# Patient Record
(demographics unavailable — no encounter records)

---

## 2024-11-15 NOTE — PHYSICAL EXAM
[de-identified] : Constitutional:  80 year old male, alert and oriented, cooperative, in no acute distress.  HEENT  NC/AT.  Appearance: symmetric  Neck/Back Straight without deformity or instability.  Good ROM.  Chest/Respiratory  Respiratory effort: no intercostal retractions or use of accessory muscles. Nonlabored Breathing  Mental Status:  Judgment, insight: intact Orientation: oriented to time, place, and person  Neurological: Sensory and Motor are grossly intact throughout  Right Hip Exam: Inspection/Appearance:      Incision well healed, no erythema or drainage  Tenderness:  	Sacroiliac: Negative  	Greater trochanter: Negative                   ZONIA Test: Negative                  FADIR Test: Negative   Range of Motion:                 Extension - 0  	Flexion - 100  	IR - 30  	ER - 40  	Abd - 40  	Add - 30   Stability: Normal without instability  Neurologic Exam     Motor intact including 5/5 Extensor Hallucis Longus, 5/5 Flexor Hallucis Longus, 5/5 Tibialis Anterior and 5/5 Gastrocnemius     Sensation Intact to Light Touch including Saphenous, Sural, Superficial Peroneal, Deep Peroneal, Tibial nerve distributions  Vascular Exam     Foot is warm and well perfused with 2+ Dorsalis Pedis Pulse  No pain with range of motion of the left hip or bilateral knees. No lumbar paraspinal muscle tenderness. [de-identified] : XRay:  XRays of the Pelvis (1 View) and Right Hip (2 Views) taken in the office today and reviewed with the patient. XRays demonstrate a Right Total Hip Arthroplasty in good position and alignment. There is no obvious evidence of fracture, dislocation, osteolysis or loosening. (my personal interpretation)

## 2024-11-15 NOTE — DISCUSSION/SUMMARY
[de-identified] : Wade Wilhelm is a 80-year-old male who presents to the office for evaluation of his right hip pain.  Patient has been experiencing pain over the abductors.  X-rays showed right total hip arthroplasty in good position and alignment.  Examination showed good right hip range of motion. Discussed with the patient the examination and imaging findings.  Discussed with the patient the management of patient's hip pain at this time, including physical therapy, anti-inflammatories, and injections.  Patient was given referral for physical therapy.  Patient will take over-the-counter anti-inflammatories as needed for pain control.  Patient will follow-up in 2 months for reevaluation and management.  Patient understanding and in agreement the plan.  All questions answered   Plan: -Physical Therapy -Over-the-counter anti-inflammatories as needed for pain control -Follow up in 2 months for reevaluation and management

## 2024-11-15 NOTE — DISCUSSION/SUMMARY
[de-identified] : Wade Wilhelm is a 80-year-old male who presents to the office for evaluation of his right hip pain.  Patient has been experiencing pain over the abductors.  X-rays showed right total hip arthroplasty in good position and alignment.  Examination showed good right hip range of motion. Discussed with the patient the examination and imaging findings.  Discussed with the patient the management of patient's hip pain at this time, including physical therapy, anti-inflammatories, and injections.  Patient was given referral for physical therapy.  Patient will take over-the-counter anti-inflammatories as needed for pain control.  Patient will follow-up in 2 months for reevaluation and management.  Patient understanding and in agreement the plan.  All questions answered   Plan: -Physical Therapy -Over-the-counter anti-inflammatories as needed for pain control -Follow up in 2 months for reevaluation and management

## 2024-11-15 NOTE — HISTORY OF PRESENT ILLNESS
[de-identified] : Wade Wilhelm is a 80-year-old male who presents to the office for evaluation of his right hip pain.  Patient has a history of a right MIRZA in 2019 by Dr. Alcantara.  He has been having some right hip pain since Saturday.  Pain is located over the lateral hip and abductors.  He has not tried pain medications.  He has not tried physical therapy.  History: Diabetes

## 2024-11-15 NOTE — PHYSICAL EXAM
[de-identified] : Constitutional:  80 year old male, alert and oriented, cooperative, in no acute distress.  HEENT  NC/AT.  Appearance: symmetric  Neck/Back Straight without deformity or instability.  Good ROM.  Chest/Respiratory  Respiratory effort: no intercostal retractions or use of accessory muscles. Nonlabored Breathing  Mental Status:  Judgment, insight: intact Orientation: oriented to time, place, and person  Neurological: Sensory and Motor are grossly intact throughout  Right Hip Exam: Inspection/Appearance:      Incision well healed, no erythema or drainage  Tenderness:  	Sacroiliac: Negative  	Greater trochanter: Negative                   ZONIA Test: Negative                  FADIR Test: Negative   Range of Motion:                 Extension - 0  	Flexion - 100  	IR - 30  	ER - 40  	Abd - 40  	Add - 30   Stability: Normal without instability  Neurologic Exam     Motor intact including 5/5 Extensor Hallucis Longus, 5/5 Flexor Hallucis Longus, 5/5 Tibialis Anterior and 5/5 Gastrocnemius     Sensation Intact to Light Touch including Saphenous, Sural, Superficial Peroneal, Deep Peroneal, Tibial nerve distributions  Vascular Exam     Foot is warm and well perfused with 2+ Dorsalis Pedis Pulse  No pain with range of motion of the left hip or bilateral knees. No lumbar paraspinal muscle tenderness. [de-identified] : XRay:  XRays of the Pelvis (1 View) and Right Hip (2 Views) taken in the office today and reviewed with the patient. XRays demonstrate a Right Total Hip Arthroplasty in good position and alignment. There is no obvious evidence of fracture, dislocation, osteolysis or loosening. (my personal interpretation)

## 2024-11-15 NOTE — PHYSICAL EXAM
[de-identified] : Constitutional:  80 year old male, alert and oriented, cooperative, in no acute distress.  HEENT  NC/AT.  Appearance: symmetric  Neck/Back Straight without deformity or instability.  Good ROM.  Chest/Respiratory  Respiratory effort: no intercostal retractions or use of accessory muscles. Nonlabored Breathing  Mental Status:  Judgment, insight: intact Orientation: oriented to time, place, and person  Neurological: Sensory and Motor are grossly intact throughout  Right Hip Exam: Inspection/Appearance:      Incision well healed, no erythema or drainage  Tenderness:  	Sacroiliac: Negative  	Greater trochanter: Negative                   ZONIA Test: Negative                  FADIR Test: Negative   Range of Motion:                 Extension - 0  	Flexion - 100  	IR - 30  	ER - 40  	Abd - 40  	Add - 30   Stability: Normal without instability  Neurologic Exam     Motor intact including 5/5 Extensor Hallucis Longus, 5/5 Flexor Hallucis Longus, 5/5 Tibialis Anterior and 5/5 Gastrocnemius     Sensation Intact to Light Touch including Saphenous, Sural, Superficial Peroneal, Deep Peroneal, Tibial nerve distributions  Vascular Exam     Foot is warm and well perfused with 2+ Dorsalis Pedis Pulse  No pain with range of motion of the left hip or bilateral knees. No lumbar paraspinal muscle tenderness. [de-identified] : XRay:  XRays of the Pelvis (1 View) and Right Hip (2 Views) taken in the office today and reviewed with the patient. XRays demonstrate a Right Total Hip Arthroplasty in good position and alignment. There is no obvious evidence of fracture, dislocation, osteolysis or loosening. (my personal interpretation)

## 2024-11-15 NOTE — HISTORY OF PRESENT ILLNESS
[de-identified] : Wade Wilhelm is a 80-year-old male who presents to the office for evaluation of his right hip pain.  Patient has a history of a right MIRZA in 2019 by Dr. Alcantara.  He has been having some right hip pain since Saturday.  Pain is located over the lateral hip and abductors.  He has not tried pain medications.  He has not tried physical therapy.  History: Diabetes

## 2024-11-15 NOTE — HISTORY OF PRESENT ILLNESS
[de-identified] : Wade Wilhelm is a 80-year-old male who presents to the office for evaluation of his right hip pain.  Patient has a history of a right MIRZA in 2019 by Dr. Alcantara.  He has been having some right hip pain since Saturday.  Pain is located over the lateral hip and abductors.  He has not tried pain medications.  He has not tried physical therapy.  History: Diabetes

## 2024-11-15 NOTE — DISCUSSION/SUMMARY
[de-identified] : Wade Wilhelm is a 80-year-old male who presents to the office for evaluation of his right hip pain.  Patient has been experiencing pain over the abductors.  X-rays showed right total hip arthroplasty in good position and alignment.  Examination showed good right hip range of motion. Discussed with the patient the examination and imaging findings.  Discussed with the patient the management of patient's hip pain at this time, including physical therapy, anti-inflammatories, and injections.  Patient was given referral for physical therapy.  Patient will take over-the-counter anti-inflammatories as needed for pain control.  Patient will follow-up in 2 months for reevaluation and management.  Patient understanding and in agreement the plan.  All questions answered   Plan: -Physical Therapy -Over-the-counter anti-inflammatories as needed for pain control -Follow up in 2 months for reevaluation and management

## 2025-01-15 NOTE — PHYSICAL EXAM
[de-identified] : Constitutional:  80 year old male, alert and oriented, cooperative, in no acute distress.  HEENT  NC/AT.  Appearance: symmetric  Neck/Back Straight without deformity or instability.  Good ROM.  Chest/Respiratory  Respiratory effort: no intercostal retractions or use of accessory muscles. Nonlabored Breathing  Mental Status:  Judgment, insight: intact Orientation: oriented to time, place, and person  Neurological: Sensory and Motor are grossly intact throughout  Right Hip Exam: Inspection/Appearance:      Incision well healed, no erythema or drainage  Tenderness:  	Sacroiliac: Negative  	Greater trochanter: Negative                   ZONIA Test: Negative                  FADIR Test: Negative   Range of Motion:                 Extension - 0  	Flexion - 100  	IR - 30  	ER - 40  	Abd - 40  	Add - 30   Stability: Normal without instability  Neurologic Exam     Motor intact including 5/5 Extensor Hallucis Longus, 5/5 Flexor Hallucis Longus, 5/5 Tibialis Anterior and 5/5 Gastrocnemius     Sensation Intact to Light Touch including Saphenous, Sural, Superficial Peroneal, Deep Peroneal, Tibial nerve distributions  Vascular Exam     Foot is warm and well perfused with 2+ Dorsalis Pedis Pulse  No pain with range of motion of the left hip or bilateral knees. No lumbar paraspinal muscle tenderness. [de-identified] : XRay:  XRays of the Pelvis (1 View) and Right Hip (2 Views) taken 11/14/2024. XRays demonstrate a Right Total Hip Arthroplasty in good position and alignment. There is no obvious evidence of fracture, dislocation, osteolysis or loosening. (my personal interpretation)

## 2025-01-15 NOTE — HISTORY OF PRESENT ILLNESS
[de-identified] : 1/16/2025 11/14/2024 Wade Wilhelm is a 80-year-old male who presents to the office for evaluation of his right hip pain.  Patient has a history of a right MIRZA in 2019 by Dr. Alcantara.  He has been having some right hip pain since Saturday.  Pain is located over the lateral hip and abductors.  He has not tried pain medications.  He has not tried physical therapy.  History: Diabetes

## 2025-01-15 NOTE — DISCUSSION/SUMMARY
[de-identified] : Wade Wilhelm is a 80-year-old male who presents to the office for evaluation of his right hip pain.  Patient has been experiencing pain over the abductors.  X-rays showed right total hip arthroplasty in good position and alignment.  Examination showed good right hip range of motion. Discussed with the patient the examination and imaging findings.  Discussed with the patient the management of patient's hip pain at this time, including physical therapy, anti-inflammatories, and injections.  Patient was given referral for physical therapy.  Patient will take over-the-counter anti-inflammatories as needed for pain control.  Patient will follow-up in 2 months for reevaluation and management.  Patient understanding and in agreement the plan.  All questions answered   Plan: -Physical Therapy -Over-the-counter anti-inflammatories as needed for pain control -Follow up in 2 months for reevaluation and management